# Patient Record
Sex: MALE | Race: WHITE | Employment: FULL TIME | URBAN - METROPOLITAN AREA
[De-identification: names, ages, dates, MRNs, and addresses within clinical notes are randomized per-mention and may not be internally consistent; named-entity substitution may affect disease eponyms.]

---

## 2019-09-05 ENCOUNTER — APPOINTMENT (OUTPATIENT)
Dept: GENERAL RADIOLOGY | Age: 74
End: 2019-09-05
Attending: EMERGENCY MEDICINE
Payer: MEDICARE

## 2019-09-05 ENCOUNTER — APPOINTMENT (OUTPATIENT)
Dept: GENERAL RADIOLOGY | Age: 74
End: 2019-09-05
Payer: MEDICARE

## 2019-09-05 ENCOUNTER — HOSPITAL ENCOUNTER (EMERGENCY)
Age: 74
Discharge: HOME | End: 2019-09-05
Attending: EMERGENCY MEDICINE
Payer: MEDICARE

## 2019-09-05 VITALS
DIASTOLIC BLOOD PRESSURE: 103 MMHG | RESPIRATION RATE: 18 BRPM | WEIGHT: 195 LBS | OXYGEN SATURATION: 97 % | BODY MASS INDEX: 26.41 KG/M2 | TEMPERATURE: 97.6 F | HEIGHT: 72 IN | HEART RATE: 67 BPM | SYSTOLIC BLOOD PRESSURE: 170 MMHG

## 2019-09-05 DIAGNOSIS — S80.01XA CONTUSION OF RIGHT KNEE, INITIAL ENCOUNTER: ICD-10-CM
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
## 2019-09-05 DIAGNOSIS — S63.281A DISLOCATION OF PROXIMAL INTERPHALANGEAL JOINT OF LEFT INDEX FINGER, INITIAL ENCOUNTER: ICD-10-CM

## 2019-09-05 DIAGNOSIS — T14.8XXA ABRASION: ICD-10-CM

## 2019-09-05 DIAGNOSIS — S61.211A LACERATION OF LEFT INDEX FINGER WITHOUT FOREIGN BODY WITHOUT DAMAGE TO NAIL, INITIAL ENCOUNTER: ICD-10-CM

## 2019-09-05 PROCEDURE — 77030002916 HC SUT ETHLN J&J -A

## 2019-09-05 PROCEDURE — 74011000250 HC RX REV CODE- 250: Performed by: PHYSICIAN ASSISTANT

## 2019-09-05 PROCEDURE — 74011250636 HC RX REV CODE- 250/636: Performed by: PHYSICIAN ASSISTANT

## 2019-09-05 PROCEDURE — 99283 EMERGENCY DEPT VISIT LOW MDM: CPT | Performed by: PHYSICIAN ASSISTANT

## 2019-09-05 PROCEDURE — 75810000293 HC SIMP/SUPERF WND  RPR: Performed by: PHYSICIAN ASSISTANT

## 2019-09-05 PROCEDURE — 73140 X-RAY EXAM OF FINGER(S): CPT

## 2019-09-05 PROCEDURE — 73562 X-RAY EXAM OF KNEE 3: CPT

## 2019-09-05 PROCEDURE — 73130 X-RAY EXAM OF HAND: CPT

## 2019-09-05 PROCEDURE — 77030008303 HC SPLNT FNGR ALUM CONC -A

## 2019-09-05 PROCEDURE — 75810000301 HC ER LEVEL 1 CLOSED TREATMNT FRACTURE/DISLOCATION: Performed by: PHYSICIAN ASSISTANT

## 2019-09-05 RX ORDER — CEPHALEXIN 500 MG/1
500 CAPSULE ORAL 4 TIMES DAILY
Qty: 40 CAP | Refills: 0 | Status: SHIPPED | OUTPATIENT
Start: 2019-09-05 | End: 2019-09-15

## 2019-09-05 RX ORDER — TRAMADOL HYDROCHLORIDE 50 MG/1
50 TABLET ORAL
Qty: 18 TAB | Refills: 0 | Status: SHIPPED | OUTPATIENT
Start: 2019-09-05 | End: 2019-09-08

## 2019-09-05 RX ORDER — CEFAZOLIN SODIUM 1 G/3ML
1 INJECTION, POWDER, FOR SOLUTION INTRAMUSCULAR; INTRAVENOUS
Status: DISCONTINUED | OUTPATIENT
Start: 2019-09-05 | End: 2019-09-05

## 2019-09-05 RX ADMIN — CEFAZOLIN SODIUM 1000 MG: 1 INJECTION, POWDER, FOR SOLUTION INTRAMUSCULAR; INTRAVENOUS at 16:39

## 2019-09-05 NOTE — ED NOTES
Knee abrasion dressed with neosporin, telfa, 4x4 and kerlix. Left hand abrasion to palm dressed with neosporin, telfa, 4x4 and kerlix. Finger laceration dressed with neosporin, telfa and tube gauze. Index finger stablized with aluminum finger splint to under side of hand.

## 2019-09-05 NOTE — DISCHARGE INSTRUCTIONS
Call the orthopedic physician for follow-up. Leave the splint in place until you see them. Antibiotics as directed. Return to the ED if having fevers, increasing pain or swelling to the finger, red streaking up your arm, nausea, vomiting or worsening symptoms. Rest, ice, elevate and avoid painful activities. Return to the ED if worsening in any way. Cuts on the Hand Closed With Stitches: Care Instructions  Your Care Instructions    A cut on your hand can be on your fingers, your thumb, or the front or back of your hand. Sometimes a cut can injure the tendons, blood vessels, or nerves of your hand. The doctor used stitches to close the cut. Using stitches also helps the cut heal and reduces scarring. The doctor may have given you a splint to help prevent you from moving your hand, fingers, or thumb. If the cut went deep and through the skin, the doctor put in two layers of stitches. The deeper layer brings the deep part of the cut together. These stitches will dissolve and don't need to be removed. The stitches in the upper layer are the ones you see on the cut. You will probably have a bandage. You will need to have the stitches removed, usually in 7 to 14 days. The doctor may suggest that you see a hand specialist if the cut is very deep or if you have trouble moving your fingers or have less feeling in your hand. The doctor has checked you carefully, but problems can develop later. If you notice any problems or new symptoms, get medical treatment right away. Follow-up care is a key part of your treatment and safety. Be sure to make and go to all appointments, and call your doctor if you are having problems. It's also a good idea to know your test results and keep a list of the medicines you take. How can you care for yourself at home? · Keep the cut dry for the first 24 to 48 hours. After this, you can shower if your doctor okays it. Pat the cut dry. · Don't soak the cut, such as in a bathtub. Your doctor will tell you when it's safe to get the cut wet. · If your doctor told you how to care for your cut, follow your doctor's instructions. If you did not get instructions, follow this general advice:  ? After the first 24 to 48 hours, wash around the cut with clean water 2 times a day. Don't use hydrogen peroxide or alcohol, which can slow healing. ? You may cover the cut with a thin layer of petroleum jelly, such as Vaseline, and a nonstick bandage. ? Apply more petroleum jelly and replace the bandage as needed. · Prop up the sore hand on a pillow anytime you sit or lie down during the next 3 days. Try to keep it above the level of your heart. This will help reduce swelling. · Avoid any activity that could cause your cut to reopen. · Do not remove the stitches on your own. Your doctor will tell you when to come back to have the stitches removed. · Be safe with medicines. Take pain medicines exactly as directed. ? If the doctor gave you a prescription medicine for pain, take it as prescribed. ? If you are not taking a prescription pain medicine, ask your doctor if you can take an over-the-counter medicine. When should you call for help? Call your doctor now or seek immediate medical care if:    · You have new pain, or your pain gets worse.     · The skin near the cut is cold or pale or changes color.     · You have tingling, weakness, or numbness near the cut.     · The cut starts to bleed, and blood soaks through the bandage. Oozing small amounts of blood is normal.     · You have trouble moving the area of the hand near the cut.     · You have symptoms of infection, such as:  ? Increased pain, swelling, warmth, or redness around the cut.  ? Red streaks leading from the cut.  ? Pus draining from the cut.  ? A fever.    Watch closely for changes in your health, and be sure to contact your doctor if:    · You do not get better as expected. Where can you learn more?   Go to http://hany-ant.info/. Enter T250 in the search box to learn more about \"Cuts on the Hand Closed With Stitches: Care Instructions. \"  Current as of: September 23, 2018  Content Version: 12.1  © 0539-0470 [employer]. Care instructions adapted under license by [employer] (which disclaims liability or warranty for this information). If you have questions about a medical condition or this instruction, always ask your healthcare professional. [address] any warranty or liability for your use of this information. Patient Education        Dislocated Finger: Care Instructions  Your Care Instructions  When the bones of a finger are forced out of their normal position, it is called a dislocated finger. This can happen when a finger jams or bends backward. It is common during sports. A doctor can put your finger back in its normal position. You probably knew that something was wrong with your finger right away. This is because a dislocated finger usually hurts a lot. And it doesn't look straight. Your doctor may have put a splint on the finger. This will keep it in position while it heals. Your doctor may also recommend exercises to strengthen your finger. Rest and home treatment can also help you get better. If you damaged bones or muscles, you may need more treatment. Follow-up care is a key part of your treatment and safety. Be sure to make and go to all appointments, and call your doctor if you are having problems. It's also a good idea to know your test results and keep a list of the medicines you take. How can you care for yourself at home? · If your doctor put a splint on your finger, wear it as directed. Do not remove it until your doctor says you can. · Do not do anything that makes the pain worse. · If your finger is swollen, put ice or a cold pack on it for 10 to 20 minutes at a time.  Try to do this every 1 to 2 hours for the next 3 days (when you are awake) or until the swelling goes down. Put a thin cloth between the ice and your skin. · Prop up your hand on a pillow when you ice it or anytime you sit or lie down during the next 3 days. Try to keep it above the level of your heart. This will help reduce swelling. · Take your medicines exactly as prescribed. Call your doctor if you think you are having a problem with your medicine. · Ask your doctor if you can take an over-the-counter pain medicine, such as acetaminophen (Tylenol), ibuprofen (Advil, Motrin), or naproxen (Aleve). Be safe with medicines. Read and follow all instructions on the label. · If your doctor recommends exercises, do them as directed. When should you call for help? Call your doctor now or seek immediate medical care if:    · You have new or worse pain.     · Your finger is cool or pale or changes color.     · You have tingling, weakness, or numbness in the finger.    Watch closely for changes in your health, and be sure to contact your doctor if:    · You do not get better as expected. Where can you learn more? Go to http://hany-ant.info/. Enter R117 in the search box to learn more about \"Dislocated Finger: Care Instructions. \"  Current as of: September 20, 2018  Content Version: 12.1  © 3951-1961 [employer]. Care instructions adapted under license by "[employer] (which disclaims liability or warranty for this information). If you have questions about a medical condition or this instruction, always ask your healthcare professional. [place] [address] any warranty or liability for your use of this information.

## 2019-09-05 NOTE — ED TRIAGE NOTES
Pt ambulatory to triage with c/o fall after tripping on a lip in the concrete during daily walk. Pt denies dizzinesss before the fall. Pt reports left index finger pain and lac with left knee pain and abrasion to left knee already covered before triage. Pt denies hitting head during falll.

## 2019-09-05 NOTE — ED PROVIDER NOTES
Patient states that he was walking his normal two mile walk today and tripped on a rise in the sidewalk. He landed on his left hand and right knee. He is left-handed. He has a laceration and dislocation to the left index finger and an abrasion to the right knee. He did not hit his head nor did he have any loss of consciousness. His last tetanus shot was 2 to 3 years ago. He is able to ambulate. He is not having any chest pain, shortness of breath, dizziness, weakness, dyspnea on exertion, orthopnea, weakness, trouble with urination or other new symptoms. His daughter drove him in today. The history is provided by the patient. Fall   The accident occurred less than 1 hour ago. The fall occurred while walking. She fell from a height of ground level. She landed on concrete. The volume of blood lost was minimal. Point of impact: Left hand, right knee. Pain location: Left hand. The pain is at a severity of 7/10. The pain is moderate. She was ambulatory at the scene. There was no entrapment after the fall. There was no drug use involved in the accident. There was no alcohol use involved in the accident. Associated symptoms include laceration. Pertinent negatives include no visual change, no fever, no numbness, no abdominal pain, no bowel incontinence, no nausea, no vomiting, no hematuria, no headaches, no extremity weakness, no hearing loss, no loss of consciousness and no tingling. The risk factors include being elderly. The symptoms are aggravated by activity. She has tried nothing for the symptoms. The patient's last tetanus shot was less than 5 years ago.        Past Medical History:   Diagnosis Date    Seasonal allergic rhinitis     Tonsillitis        Past Surgical History:   Procedure Laterality Date    HX TONSILLECTOMY           Family History:   Problem Relation Age of Onset    No Known Problems Mother     No Known Problems Father        Social History     Socioeconomic History    Marital status:      Spouse name: Not on file    Number of children: Not on file    Years of education: Not on file    Highest education level: Not on file   Occupational History    Not on file   Social Needs    Financial resource strain: Not on file    Food insecurity:     Worry: Not on file     Inability: Not on file    Transportation needs:     Medical: Not on file     Non-medical: Not on file   Tobacco Use    Smoking status: Never Smoker   Substance and Sexual Activity    Alcohol use: Yes     Comment: social    Drug use: Not on file    Sexual activity: Not on file   Lifestyle    Physical activity:     Days per week: Not on file     Minutes per session: Not on file    Stress: Not on file   Relationships    Social connections:     Talks on phone: Not on file     Gets together: Not on file     Attends Yarsanism service: Not on file     Active member of club or organization: Not on file     Attends meetings of clubs or organizations: Not on file     Relationship status: Not on file    Intimate partner violence:     Fear of current or ex partner: Not on file     Emotionally abused: Not on file     Physically abused: Not on file     Forced sexual activity: Not on file   Other Topics Concern    Not on file   Social History Narrative    Not on file         ALLERGIES: Other plant, animal, environmental and Sulfa (sulfonamide antibiotics)    Review of Systems   Constitutional: Negative. Negative for fever. HENT: Negative. Eyes: Negative. Respiratory: Negative. Cardiovascular: Negative. Gastrointestinal: Negative. Negative for abdominal pain, bowel incontinence, nausea and vomiting. Genitourinary: Negative. Negative for hematuria. Musculoskeletal: Negative. Negative for extremity weakness. Left hand, finger pain   Skin: Positive for wound. Negative for color change, pallor and rash. Neurological: Negative. Negative for tingling, loss of consciousness, numbness and headaches. Psychiatric/Behavioral: Negative. All other systems reviewed and are negative. Vitals:    09/05/19 1308   BP: (!) 158/101   Pulse: 74   Resp: 16   Temp: 97.6 °F (36.4 °C)   SpO2: 98%   Weight: 88.5 kg (195 lb)   Height: 6' (1.829 m)            Physical Exam   Constitutional: She is oriented to person, place, and time. She appears well-developed and well-nourished. HENT:   Head: Normocephalic and atraumatic. Right Ear: External ear normal.   Left Ear: External ear normal.   Nose: Nose normal.   Mouth/Throat: Oropharynx is clear and moist.   Eyes: Pupils are equal, round, and reactive to light. Conjunctivae and EOM are normal.   Neck: Normal range of motion. Neck supple. Cardiovascular: Normal rate, regular rhythm, normal heart sounds and intact distal pulses. Pulmonary/Chest: Effort normal and breath sounds normal.   Abdominal: Soft. Bowel sounds are normal.   Musculoskeletal: Normal range of motion. Hands:       Legs:  Neurological: She is alert and oriented to person, place, and time. She has normal reflexes. Skin: Skin is warm and dry. Laceration noted. Psychiatric: She has a normal mood and affect. Her behavior is normal. Judgment and thought content normal.   Nursing note and vitals reviewed.        MDM  Number of Diagnoses or Management Options  Abrasion:   Contusion of right knee, initial encounter:   Dislocation of proximal interphalangeal joint of left index finger, initial encounter:   Fall, initial encounter:   Laceration of left index finger without foreign body without damage to nail, initial encounter:      Amount and/or Complexity of Data Reviewed  Tests in the radiology section of CPT®: ordered and reviewed  Discuss the patient with other providers: yes (Dr. Yassine Leung)    Risk of Complications, Morbidity, and/or Mortality  Presenting problems: moderate  Diagnostic procedures: moderate  Management options: moderate    Patient Progress  Patient progress: improved         Wound Repair  Date/Time: 9/5/2019 5:15 PM  Performed by: PAPreparation: skin prepped with Betadine  Pre-procedure re-eval: Immediately prior to the procedure, the patient was reevaluated and found suitable for the planned procedure and any planned medications. Time out: Immediately prior to the procedure a time out was called to verify the correct patient, procedure, equipment, staff and marking as appropriate. .  Location details: left index finger  Wound length:2.5 cm or less  Anesthesia: digital block    Anesthesia:  Local Anesthetic: lidocaine 1% without epinephrine  Anesthetic total: 6 mL  Foreign bodies: no foreign bodies  Irrigation solution: saline  Debridement: none  Skin closure: 4-0 nylon  Number of sutures: 7  Technique: simple and interrupted  Dressing: 4x4, antibiotic ointment, tube gauze and splint  Patient tolerance: Patient tolerated the procedure well with no immediate complications  My total time at bedside, performing this procedure was 16-30 minutes. 4:35 PM Spoke with Dr. Johnson Sepulveda regarding patient. We discussed the patient's history, physical exam, x-ray findings and treatment. Patient was given a dose of Ancef since this is an open fracture/dislocation. The finger was reduced without difficulty and the wound was irrigated extensively with approximately 500 mL's of normal saline. The wound was closed and splinted. X-ray shows good anatomic reduction of the dislocation. Patient was told to call the orthopedic for follow-up and leave the dressing and splint on until he sees them. I have written for cephalexin at home to take as directed. His tetanus is up-to-date. He was instructed on wound care for the abrasion on his knee. Patient is stable for discharge at this time and ambulatory out of the ER without difficulty. His daughter is driving him home. I did write him some tramadol if needed at home for pain. The patient was observed in the ED.     Results Reviewed:  XR 2ND FINGER LT MIN 2 V   Final Result   IMPRESSION:   1. Interval alignment of the second PIP joint following reduction. 2. Possible volar plate fracture as described. XR HAND LT MIN 3 V   Final Result   IMPRESSION: Dorsal dislocation of the second PIP joint. XR KNEE LT 3 V   Final Result   IMPRESSION: Unremarkable left knee radiographs for acute bony abnormality. I discussed the results of all labs, procedures, radiographs, and treatments with the patient and available family. Treatment plan is agreed upon and the patient is ready for discharge. All voiced understanding of the discharge plan and medication instructions or changes as appropriate. Questions about treatment in the ED were answered. All were encouraged to return should symptoms worsen or new problems develop.

## 2019-09-05 NOTE — ED NOTES
I have reviewed discharge instructions with the patient. The patient and caregiver verbalized understanding. Patient left ED via Discharge Method: ambulatory to Home with daughter. Opportunity for questions and clarification provided. Patient given 2 scripts. Tramadol/keflex. Pt instructed to take tramadol for pain. Start keflex tomorrow morning and follow up with orthopedic md provided. Pt also instructed to check BP at the same time daily for 1 week and take readings to PCP. Pt states severe \"white coat syndrome. \"        To continue your aftercare when you leave the hospital, you may receive an automated call from our care team to check in on how you are doing. This is a free service and part of our promise to provide the best care and service to meet your aftercare needs.  If you have questions, or wish to unsubscribe from this service please call [phone]. Thank you for Choosing our [facility] Emergency Department.

## 2024-07-29 ENCOUNTER — HOSPITAL ENCOUNTER (EMERGENCY)
Age: 79
Discharge: HOME | End: 2024-07-29
Payer: MEDICARE

## 2024-07-29 VITALS
HEIGHT: 72 IN | BODY MASS INDEX: 26.41 KG/M2 | SYSTOLIC BLOOD PRESSURE: 175 MMHG | RESPIRATION RATE: 18 BRPM | OXYGEN SATURATION: 97 % | HEART RATE: 84 BPM | TEMPERATURE: 98.5 F | DIASTOLIC BLOOD PRESSURE: 98 MMHG | WEIGHT: 195 LBS

## 2024-07-29 DIAGNOSIS — K04.7 DENTAL ABSCESS: Primary | ICD-10-CM

## 2024-07-29 PROCEDURE — 99283 EMERGENCY DEPT VISIT LOW MDM: CPT

## 2024-07-29 PROCEDURE — 6370000000 HC RX 637 (ALT 250 FOR IP)

## 2024-07-29 RX ORDER — AMOXICILLIN AND CLAVULANATE POTASSIUM 875; 125 MG/1; MG/1
1 TABLET, FILM COATED ORAL
Status: COMPLETED | OUTPATIENT
Start: 2024-07-29 | End: 2024-07-29

## 2024-07-29 RX ORDER — HYDROCODONE BITARTRATE AND ACETAMINOPHEN 5; 325 MG/1; MG/1
1 TABLET ORAL EVERY 6 HOURS PRN
Qty: 10 TABLET | Refills: 0 | Status: SHIPPED | OUTPATIENT
Start: 2024-07-29 | End: 2024-08-01

## 2024-07-29 RX ORDER — HYDROCODONE BITARTRATE AND ACETAMINOPHEN 7.5; 325 MG/1; MG/1
1 TABLET ORAL
Status: COMPLETED | OUTPATIENT
Start: 2024-07-29 | End: 2024-07-29

## 2024-07-29 RX ORDER — AMOXICILLIN AND CLAVULANATE POTASSIUM 875; 125 MG/1; MG/1
1 TABLET, FILM COATED ORAL 2 TIMES DAILY
Qty: 20 TABLET | Refills: 0 | Status: SHIPPED | OUTPATIENT
Start: 2024-07-29 | End: 2024-08-08

## 2024-07-29 RX ADMIN — HYDROCODONE BITARTRATE AND ACETAMINOPHEN 1 TABLET: 7.5; 325 TABLET ORAL at 21:11

## 2024-07-29 RX ADMIN — AMOXICILLIN AND CLAVULANATE POTASSIUM 1 TABLET: 875; 125 TABLET, FILM COATED ORAL at 21:11

## 2024-07-29 ASSESSMENT — PAIN - FUNCTIONAL ASSESSMENT: PAIN_FUNCTIONAL_ASSESSMENT: 0-10

## 2024-07-29 ASSESSMENT — PAIN SCALES - GENERAL: PAINLEVEL_OUTOF10: 7

## 2024-07-30 NOTE — ED TRIAGE NOTES
Pt to ED with c/o right sided upper dental pain. Pt states been getting worse over the past week. Pt states swelling to area. Pt states broken tooth. Pt denies taking anything for pain prior to arrival. Pt alert ambulatory and in no acute distress at this time.

## 2024-07-30 NOTE — DISCHARGE INSTRUCTIONS
Prescription for pain medication and antibiotics sent to pharmacy as requested.  Please note the hydrocodone may make you feel drowsy and impair your ability to drive and potentially increase your risk for falling.  Make sure you take the entire course of antibiotics even if you are feeling better.    Follow-up with dentistry/oral surgery as planned.  Please return with any new or worsening symptoms.

## 2024-07-30 NOTE — ED PROVIDER NOTES
Emergency Department Provider Note       PCP: Anthony Jaffe MD   Age: 79 y.o.   Sex: male     DISPOSITION Decision To Discharge 07/29/2024 09:14:25 PM       ICD-10-CM    1. Dental abscess  K04.7 HYDROcodone-acetaminophen (NORCO) 5-325 MG per tablet          Medical Decision Making     Patient with fractured right upper tooth with developing periapical abscess.  No evidence of deep space infection.  Stable vital signs.  Able to tolerate secretions and p.o. without difficulty.  Will plan to discharge home with antibiotics and short course of analgesia until he can follow-up with dentistry.       1 acute, uncomplicated illness or injury.  Prescription drug management performed.    I independently ordered and reviewed each unique test.      History     79-year-old male presents to the emergency department with concern for worsening dental pain.  States he has a known fractured tooth in the right upper dentition which has recently become very painful.  He notes some associated facial swelling developing over the past 24 hours or so.  Denies any known fevers or difficulty swallowing.  States that he is planning to see an oral surgeon tomorrow but is hoping for some sort of relief in the interim.  He has no further complaints at this time.    The history is provided by the patient.       Physical Exam     Vitals signs and nursing note reviewed:  Vitals:    07/29/24 2026   BP: (!) 175/98   Pulse: 84   Resp: 18   Temp: 98.5 °F (36.9 °C)   TempSrc: Oral   SpO2: 97%   Weight: 88.5 kg (195 lb)   Height: 1.829 m (6')      Physical Exam  Vitals and nursing note reviewed.   Constitutional:       General: He is not in acute distress.     Appearance: Normal appearance. He is not ill-appearing or diaphoretic.   HENT:      Head: Normocephalic and atraumatic.      Right Ear: External ear normal.      Left Ear: External ear normal.      Nose: Nose normal.      Mouth/Throat:      Mouth: Mucous membranes are moist. No oral lesions.

## 2025-02-20 ENCOUNTER — HOSPITAL ENCOUNTER (EMERGENCY)
Age: 80
Discharge: HOME | End: 2025-02-20
Attending: EMERGENCY MEDICINE
Payer: MEDICARE

## 2025-02-20 VITALS
SYSTOLIC BLOOD PRESSURE: 141 MMHG | OXYGEN SATURATION: 97 % | TEMPERATURE: 98 F | HEART RATE: 89 BPM | DIASTOLIC BLOOD PRESSURE: 77 MMHG | RESPIRATION RATE: 16 BRPM | BODY MASS INDEX: 26.45 KG/M2 | WEIGHT: 195 LBS

## 2025-02-20 DIAGNOSIS — K40.90 UNILATERAL INGUINAL HERNIA WITHOUT OBSTRUCTION OR GANGRENE, RECURRENCE NOT SPECIFIED: ICD-10-CM

## 2025-02-20 DIAGNOSIS — R33.9 URINARY RETENTION: Primary | ICD-10-CM

## 2025-02-20 LAB
APPEARANCE UR: CLEAR
BACTERIA URNS QL MICRO: NEGATIVE /HPF
BILIRUB UR QL: NEGATIVE
COLOR UR: ABNORMAL
EPI CELLS #/AREA URNS HPF: ABNORMAL /HPF
GLUCOSE UR STRIP.AUTO-MCNC: NEGATIVE MG/DL
HGB UR QL STRIP: ABNORMAL
HYALINE CASTS URNS QL MICRO: ABNORMAL /LPF
KETONES UR QL STRIP.AUTO: NEGATIVE MG/DL
LEUKOCYTE ESTERASE UR QL STRIP.AUTO: NEGATIVE
NITRITE UR QL STRIP.AUTO: NEGATIVE
PH UR STRIP: 6.5 (ref 5–9)
PROT UR STRIP-MCNC: NEGATIVE MG/DL
RBC #/AREA URNS HPF: ABNORMAL /HPF
SP GR UR REFRACTOMETRY: 1.01 (ref 1–1.02)
UROBILINOGEN UR QL STRIP.AUTO: 0.2 EU/DL (ref 0.2–1)
WBC URNS QL MICRO: ABNORMAL /HPF

## 2025-02-20 PROCEDURE — 81001 URINALYSIS AUTO W/SCOPE: CPT

## 2025-02-20 PROCEDURE — 87086 URINE CULTURE/COLONY COUNT: CPT

## 2025-02-20 PROCEDURE — 51702 INSERT TEMP BLADDER CATH: CPT

## 2025-02-20 PROCEDURE — 99283 EMERGENCY DEPT VISIT LOW MDM: CPT

## 2025-02-20 RX ORDER — TAMSULOSIN HYDROCHLORIDE 0.4 MG/1
0.4 CAPSULE ORAL DAILY
Qty: 30 CAPSULE | Refills: 0 | Status: SHIPPED | OUTPATIENT
Start: 2025-02-20

## 2025-02-20 RX ORDER — HYDROCODONE BITARTRATE AND ACETAMINOPHEN 5; 325 MG/1; MG/1
1 TABLET ORAL EVERY 6 HOURS PRN
Qty: 4 TABLET | Refills: 0 | Status: SHIPPED | OUTPATIENT
Start: 2025-02-20 | End: 2025-02-21

## 2025-02-20 ASSESSMENT — PAIN SCALES - GENERAL: PAINLEVEL_OUTOF10: 10

## 2025-02-20 ASSESSMENT — PAIN DESCRIPTION - LOCATION: LOCATION: ABDOMEN

## 2025-02-20 NOTE — ED NOTES
Patient mobility status  with no difficulty.     I have reviewed discharge instructions with the patient.  The patient verbalized understanding.    Patient left ED via Discharge Method: ambulatory to Home with Extended Family:.    Opportunity for questions and clarification provided.     Patient given 0 scripts.      X 2 prescriptions sent to pharmacy

## 2025-02-22 LAB
BACTERIA SPEC CULT: NORMAL
SERVICE CMNT-IMP: NORMAL

## 2025-02-22 NOTE — ED PROVIDER NOTES
Emergency Department Provider Note       PCP: Anthony Jaffe MD   Age: 80 y.o.   Sex: male     DISPOSITION Decision To Discharge 02/20/2025 03:12:59 PM    ICD-10-CM    1. Urinary retention  R33.9 [facility]     HYDROcodone-acetaminophen (NORCO) 5-325 MG per tablet      2. Unilateral inguinal hernia without obstruction or gangrene, recurrence not specified  K40.90 [facility]     HYDROcodone-acetaminophen (NORCO) 5-325 MG per tablet          Medical Decision Making     Patient is here with his daughter.  He seems quite anxious about having the De La Fuente placed.  Went over instructions several times.  Discussed with them the need for follow-up and timeline.  Ordered leg bag and had nursing again reviewed with the patient extensively to help relieve anxiety.  Patient is fine with plan.  Discussed with him that we would refer him to surgery for evaluation of his inguinal hernia so it can be fixed as an outpatient if he so chooses.  He requested a few pain pills in case he has irritation from the De La Fuente catheter or when it is removed.     1 chronic illness with exacerbation.  Prescription drug management performed.  Shared medical decision making was utilized in creating the patients health plan today.  I independently ordered and reviewed each unique test.       The patients assessment required an independent historian: dtr.  The reason they were needed is important historical information not provided by the patient.                  History     Presents with complaint of inability to urinate.  Prior to seeing patient nursing placed a De La Fuente catheter.  He feels much better now.  He has never had a De La Fuente before.  He states he takes over-the-counter supplements for prostate.  He does not have a urologist.  He has a De La Fuente bag at bedside that has approximately a liter of urine in it.  He reports he has had an inguinal hernia for over 15 years.  He has no

## 2025-03-04 ENCOUNTER — OFFICE VISIT (OUTPATIENT)
Dept: UROLOGY | Age: 80
End: 2025-03-04
Payer: MEDICARE

## 2025-03-04 DIAGNOSIS — N40.1 BPH WITH OBSTRUCTION/LOWER URINARY TRACT SYMPTOMS: Primary | ICD-10-CM

## 2025-03-04 DIAGNOSIS — R33.9 URINARY RETENTION: ICD-10-CM

## 2025-03-04 DIAGNOSIS — N13.8 BPH WITH OBSTRUCTION/LOWER URINARY TRACT SYMPTOMS: Primary | ICD-10-CM

## 2025-03-04 DIAGNOSIS — N52.9 ERECTILE DYSFUNCTION, UNSPECIFIED ERECTILE DYSFUNCTION TYPE: ICD-10-CM

## 2025-03-04 DIAGNOSIS — R31.0 GROSS HEMATURIA: ICD-10-CM

## 2025-03-04 PROCEDURE — 1160F RVW MEDS BY RX/DR IN RCRD: CPT | Performed by: NURSE PRACTITIONER

## 2025-03-04 PROCEDURE — 99204 OFFICE O/P NEW MOD 45 MIN: CPT | Performed by: NURSE PRACTITIONER

## 2025-03-04 PROCEDURE — 1159F MED LIST DOCD IN RCRD: CPT | Performed by: NURSE PRACTITIONER

## 2025-03-04 PROCEDURE — 1123F ACP DISCUSS/DSCN MKR DOCD: CPT | Performed by: NURSE PRACTITIONER

## 2025-03-04 RX ORDER — SILDENAFIL 100 MG/1
100 TABLET, FILM COATED ORAL DAILY PRN
Qty: 10 TABLET | Refills: 2 | Status: SHIPPED | OUTPATIENT
Start: 2025-03-04

## 2025-03-04 RX ORDER — TAMSULOSIN HYDROCHLORIDE 0.4 MG/1
0.4 CAPSULE ORAL DAILY
Qty: 30 CAPSULE | Refills: 0 | Status: SHIPPED | OUTPATIENT
Start: 2025-03-04

## 2025-03-04 ASSESSMENT — ENCOUNTER SYMPTOMS
DIARRHEA: 0
VOMITING: 0
EYE PAIN: 0
SHORTNESS OF BREATH: 0
NAUSEA: 0
COUGH: 0
BACK PAIN: 0
INDIGESTION: 0
CONSTIPATION: 0
WHEEZING: 0
EYE DISCHARGE: 0
HEARTBURN: 0
BLOOD IN STOOL: 0
SKIN LESIONS: 0
ABDOMINAL PAIN: 0

## 2025-03-04 NOTE — PROGRESS NOTES
[facility]  [address] [address]  [phone]          Kahlil Sharma  : 1945    Chief Complaint   Patient presents with    New Patient    Urinary Retention          HPI     Kahlil Sharma is a 80 y.o. male referred from the ER. Seen 25 for inability to void. He reports NO prior h/o urinary retention or requiring De La Fuente catheter. He has been on OTC supplement for prostate. De La Fuente catheter was placed and started on Flomax. Urine culture negative. Here for follow up.     He feels this may have been induced by consuming marinara sauce because it was spicy. He also reports episode of hematuria after De La Fuente catheter placement. This resolved. He questioned if a kidney stone would cause retention. He has no h/o kidney stones.     No PSA on file. No personal or family h/o urological CA. Non-smoker.     Prior to hospitalization, he reports baseline nocturia and weak stream. Occ urinary hesitancy.     Has ED. Wants rx for Viagra.    Here w his daughter.      Past Medical History:   Diagnosis Date    Seasonal allergic rhinitis     Tonsillitis      Past Surgical History:   Procedure Laterality Date    TONSILLECTOMY       Current Outpatient Medications   Medication Sig Dispense Refill    tamsulosin (FLOMAX) 0.4 MG capsule Take 1 capsule by mouth daily 30 capsule 0    Cholecalciferol 50 MCG (2000) TABS Take 1 tablet by mouth daily       No current facility-administered medications for this visit.     Allergies   Allergen Reactions    Sulfa Antibiotics Other (See Comments)     Urinary pain     Social History     Socioeconomic History    Marital status:      Spouse name: Not on file    Number of children: Not on file    Years of education: Not on file    Highest education level: Not on file   Occupational History    Not on file   Tobacco Use    Smoking status: Never    Smokeless tobacco: Not on file   Vaping Use    Vaping status: Never Used   Substance and Sexual

## 2025-03-10 ENCOUNTER — RESULTS FOLLOW-UP (OUTPATIENT)
Dept: UROLOGY | Age: 80
End: 2025-03-10

## 2025-03-10 DIAGNOSIS — R30.0 DYSURIA: ICD-10-CM

## 2025-03-10 DIAGNOSIS — R30.0 DYSURIA: Primary | ICD-10-CM

## 2025-03-10 LAB
APPEARANCE UR: ABNORMAL
BACTERIA URNS QL MICRO: ABNORMAL /HPF
BILIRUB UR QL: NEGATIVE
COLOR UR: ABNORMAL
EPI CELLS #/AREA URNS HPF: ABNORMAL /HPF (ref 0–5)
GLUCOSE UR STRIP.AUTO-MCNC: NEGATIVE MG/DL
HGB UR QL STRIP: ABNORMAL
HYALINE CASTS URNS QL MICRO: ABNORMAL /LPF
KETONES UR QL STRIP.AUTO: ABNORMAL MG/DL
LEUKOCYTE ESTERASE UR QL STRIP.AUTO: ABNORMAL
NITRITE UR QL STRIP.AUTO: NEGATIVE
PH UR STRIP: 5.5 (ref 5–9)
PROT UR STRIP-MCNC: 100 MG/DL
RBC #/AREA URNS HPF: ABNORMAL /HPF (ref 0–5)
SP GR UR REFRACTOMETRY: 1.02 (ref 1–1.02)
UROBILINOGEN UR QL STRIP.AUTO: 1 EU/DL (ref 0.2–1)
WBC URNS QL MICRO: >100 /HPF (ref 0–4)

## 2025-03-10 RX ORDER — CIPROFLOXACIN 500 MG/1
500 TABLET, FILM COATED ORAL 2 TIMES DAILY
Qty: 14 TABLET | Refills: 0 | Status: SHIPPED | OUTPATIENT
Start: 2025-03-10 | End: 2025-03-16 | Stop reason: ALTCHOICE

## 2025-03-12 LAB
BACTERIA SPEC CULT: ABNORMAL
BACTERIA SPEC CULT: ABNORMAL
SERVICE CMNT-IMP: ABNORMAL

## 2025-03-12 RX ORDER — NITROFURANTOIN 25; 75 MG/1; MG/1
100 CAPSULE ORAL 2 TIMES DAILY
Qty: 14 CAPSULE | Refills: 0 | Status: SHIPPED | OUTPATIENT
Start: 2025-03-12 | End: 2025-03-19

## 2025-03-16 ENCOUNTER — APPOINTMENT (OUTPATIENT)
Dept: CT IMAGING | Age: 80
End: 2025-03-16
Payer: MEDICARE

## 2025-03-16 ENCOUNTER — HOSPITAL ENCOUNTER (EMERGENCY)
Age: 80
Discharge: HOME | End: 2025-03-16
Payer: MEDICARE

## 2025-03-16 ENCOUNTER — APPOINTMENT (OUTPATIENT)
Dept: ULTRASOUND IMAGING | Age: 80
End: 2025-03-16
Payer: MEDICARE

## 2025-03-16 VITALS
HEIGHT: 72 IN | WEIGHT: 180 LBS | OXYGEN SATURATION: 98 % | HEART RATE: 105 BPM | DIASTOLIC BLOOD PRESSURE: 96 MMHG | BODY MASS INDEX: 24.38 KG/M2 | RESPIRATION RATE: 18 BRPM | SYSTOLIC BLOOD PRESSURE: 129 MMHG | TEMPERATURE: 97.9 F

## 2025-03-16 DIAGNOSIS — K40.90 UNILATERAL INGUINAL HERNIA WITHOUT OBSTRUCTION OR GANGRENE, RECURRENCE NOT SPECIFIED: ICD-10-CM

## 2025-03-16 DIAGNOSIS — N40.0 ENLARGED PROSTATE: ICD-10-CM

## 2025-03-16 DIAGNOSIS — R33.9 URINARY RETENTION: Primary | ICD-10-CM

## 2025-03-16 DIAGNOSIS — K52.9 COLITIS: ICD-10-CM

## 2025-03-16 LAB
ANION GAP SERPL CALC-SCNC: 15 MMOL/L (ref 7–16)
APPEARANCE UR: CLEAR
BASOPHILS # BLD: 0.03 K/UL (ref 0–0.2)
BASOPHILS NFR BLD: 0.4 % (ref 0–2)
BILIRUB UR QL: NEGATIVE
BUN SERPL-MCNC: 24 MG/DL (ref 8–23)
CALCIUM SERPL-MCNC: 9.9 MG/DL (ref 8.8–10.2)
CHLORIDE SERPL-SCNC: 101 MMOL/L (ref 98–107)
CO2 SERPL-SCNC: 21 MMOL/L (ref 20–29)
COLOR UR: ABNORMAL
CREAT SERPL-MCNC: 1.19 MG/DL (ref 0.8–1.3)
DIFFERENTIAL METHOD BLD: NORMAL
EOSINOPHIL # BLD: 0.06 K/UL (ref 0–0.8)
EOSINOPHIL NFR BLD: 0.8 % (ref 0.5–7.8)
ERYTHROCYTE [DISTWIDTH] IN BLOOD BY AUTOMATED COUNT: 12.3 % (ref 11.9–14.6)
GLUCOSE SERPL-MCNC: 133 MG/DL (ref 70–99)
GLUCOSE UR STRIP.AUTO-MCNC: NEGATIVE MG/DL
HCT VFR BLD AUTO: 45.2 % (ref 41.1–50.3)
HGB BLD-MCNC: 15.1 G/DL (ref 13.6–17.2)
HGB UR QL STRIP: NEGATIVE
IMM GRANULOCYTES # BLD AUTO: 0.01 K/UL (ref 0–0.5)
IMM GRANULOCYTES NFR BLD AUTO: 0.1 % (ref 0–5)
KETONES UR QL STRIP.AUTO: ABNORMAL MG/DL
LEUKOCYTE ESTERASE UR QL STRIP.AUTO: NEGATIVE
LYMPHOCYTES # BLD: 1.13 K/UL (ref 0.5–4.6)
LYMPHOCYTES NFR BLD: 14.2 % (ref 13–44)
MCH RBC QN AUTO: 30.5 PG (ref 26.1–32.9)
MCHC RBC AUTO-ENTMCNC: 33.4 G/DL (ref 31.4–35)
MCV RBC AUTO: 91.3 FL (ref 82–102)
MONOCYTES # BLD: 0.53 K/UL (ref 0.1–1.3)
MONOCYTES NFR BLD: 6.7 % (ref 4–12)
NEUTS SEG # BLD: 6.19 K/UL (ref 1.7–8.2)
NEUTS SEG NFR BLD: 77.8 % (ref 43–78)
NITRITE UR QL STRIP.AUTO: NEGATIVE
NRBC # BLD: 0 K/UL (ref 0–0.2)
PH UR STRIP: 6 (ref 5–9)
PLATELET # BLD AUTO: 271 K/UL (ref 150–450)
PMV BLD AUTO: 10.3 FL (ref 9.4–12.3)
POTASSIUM SERPL-SCNC: 4.7 MMOL/L (ref 3.5–5.1)
PROT UR STRIP-MCNC: NEGATIVE MG/DL
RBC # BLD AUTO: 4.95 M/UL (ref 4.23–5.6)
SODIUM SERPL-SCNC: 137 MMOL/L (ref 136–145)
SP GR UR REFRACTOMETRY: 1.02 (ref 1–1.02)
UROBILINOGEN UR QL STRIP.AUTO: 0.2 EU/DL (ref 0.2–1)
WBC # BLD AUTO: 8 K/UL (ref 4.3–11.1)

## 2025-03-16 PROCEDURE — 80076 HEPATIC FUNCTION PANEL: CPT

## 2025-03-16 PROCEDURE — 99285 EMERGENCY DEPT VISIT HI MDM: CPT

## 2025-03-16 PROCEDURE — 96374 THER/PROPH/DIAG INJ IV PUSH: CPT

## 2025-03-16 PROCEDURE — 6360000004 HC RX CONTRAST MEDICATION: Performed by: PHYSICIAN ASSISTANT

## 2025-03-16 PROCEDURE — 6370000000 HC RX 637 (ALT 250 FOR IP): Performed by: PHYSICIAN ASSISTANT

## 2025-03-16 PROCEDURE — 85025 COMPLETE CBC W/AUTO DIFF WBC: CPT

## 2025-03-16 PROCEDURE — 74177 CT ABD & PELVIS W/CONTRAST: CPT

## 2025-03-16 PROCEDURE — 81003 URINALYSIS AUTO W/O SCOPE: CPT

## 2025-03-16 PROCEDURE — 51798 US URINE CAPACITY MEASURE: CPT

## 2025-03-16 PROCEDURE — 51702 INSERT TEMP BLADDER CATH: CPT

## 2025-03-16 PROCEDURE — 80048 BASIC METABOLIC PNL TOTAL CA: CPT

## 2025-03-16 PROCEDURE — 76870 US EXAM SCROTUM: CPT

## 2025-03-16 PROCEDURE — 2500000003 HC RX 250 WO HCPCS: Performed by: PHYSICIAN ASSISTANT

## 2025-03-16 RX ORDER — MORPHINE SULFATE 2 MG/ML
2 INJECTION, SOLUTION INTRAMUSCULAR; INTRAVENOUS
Status: COMPLETED | OUTPATIENT
Start: 2025-03-16 | End: 2025-03-16

## 2025-03-16 RX ORDER — LIDOCAINE HYDROCHLORIDE 20 MG/ML
JELLY TOPICAL
Status: COMPLETED | OUTPATIENT
Start: 2025-03-16 | End: 2025-03-16

## 2025-03-16 RX ORDER — CIPROFLOXACIN 500 MG/1
500 TABLET, FILM COATED ORAL 2 TIMES DAILY
Qty: 13 TABLET | Refills: 0 | Status: SHIPPED | OUTPATIENT
Start: 2025-03-16 | End: 2025-03-23

## 2025-03-16 RX ORDER — IOPAMIDOL 755 MG/ML
100 INJECTION, SOLUTION INTRAVASCULAR
Status: COMPLETED | OUTPATIENT
Start: 2025-03-16 | End: 2025-03-16

## 2025-03-16 RX ORDER — CIPROFLOXACIN 500 MG/1
500 TABLET, FILM COATED ORAL ONCE
Status: COMPLETED | OUTPATIENT
Start: 2025-03-16 | End: 2025-03-16

## 2025-03-16 RX ORDER — METRONIDAZOLE 500 MG/1
500 TABLET ORAL 3 TIMES DAILY
Qty: 21 TABLET | Refills: 0 | Status: SHIPPED | OUTPATIENT
Start: 2025-03-16 | End: 2025-03-23

## 2025-03-16 RX ORDER — METRONIDAZOLE 500 MG/1
500 TABLET ORAL
Status: COMPLETED | OUTPATIENT
Start: 2025-03-16 | End: 2025-03-16

## 2025-03-16 RX ADMIN — MORPHINE SULFATE 2 MG: 2 INJECTION, SOLUTION INTRAMUSCULAR; INTRAVENOUS at 15:10

## 2025-03-16 RX ADMIN — METRONIDAZOLE 500 MG: 500 TABLET ORAL at 22:11

## 2025-03-16 RX ADMIN — IOPAMIDOL 100 ML: 755 INJECTION, SOLUTION INTRAVENOUS at 15:43

## 2025-03-16 RX ADMIN — LIDOCAINE HYDROCHLORIDE: 20 JELLY TOPICAL at 15:25

## 2025-03-16 RX ADMIN — CIPROFLOXACIN 500 MG: 500 TABLET, FILM COATED ORAL at 22:11

## 2025-03-16 ASSESSMENT — PAIN SCALES - GENERAL: PAINLEVEL_OUTOF10: 7

## 2025-03-16 ASSESSMENT — PAIN DESCRIPTION - ORIENTATION: ORIENTATION: LOWER

## 2025-03-16 ASSESSMENT — PAIN - FUNCTIONAL ASSESSMENT: PAIN_FUNCTIONAL_ASSESSMENT: 0-10

## 2025-03-16 ASSESSMENT — PAIN DESCRIPTION - LOCATION: LOCATION: PELVIS

## 2025-03-16 NOTE — ED PROVIDER NOTES
errors may be present.  This note has not been completely proofread for errors.     Ruth Comer PA-C  03/16/25 [id number]

## 2025-03-16 NOTE — ED TRIAGE NOTES
Pt CO dysuria since 2/20 and started on flomax. Pt was seen in the ER then and gotten a palmer placed. Palmer was removed on 3/4. Pt was informed he had a UTI and given Macrobid.    Pt also advises that he was constipated for approx x1 week, but has been \"going for last 12 hours\". Pt advises that he has eliminated a large amount of stool.

## 2025-03-17 ENCOUNTER — RESULTS FOLLOW-UP (OUTPATIENT)
Dept: EMERGENCY DEPT | Age: 80
End: 2025-03-17

## 2025-03-17 LAB
ALBUMIN SERPL-MCNC: 3.4 G/DL (ref 3.2–4.6)
ALBUMIN/GLOB SERPL: 0.9 (ref 1–1.9)
ALP SERPL-CCNC: 81 U/L (ref 40–129)
ALT SERPL-CCNC: 29 U/L (ref 8–55)
AST SERPL-CCNC: 46 U/L (ref 15–37)
BILIRUB DIRECT SERPL-MCNC: <0.2 MG/DL (ref 0–0.4)
BILIRUB SERPL-MCNC: 0.6 MG/DL (ref 0–1.2)
GLOBULIN SER CALC-MCNC: 3.8 G/DL (ref 2.3–3.5)
PROT SERPL-MCNC: 7.2 G/DL (ref 6.3–8.2)

## 2025-03-17 NOTE — DISCHARGE INSTRUCTIONS
Please follow-up with the urologist in 1 to 2 days.    Call, make an appointment and f/up with general surgery.    Take medication as prescribed for colitis/enteritis.    Return to ER for worsening symptoms or other concerns.

## 2025-03-19 ENCOUNTER — TELEPHONE (OUTPATIENT)
Dept: UROLOGY | Age: 80
End: 2025-03-19

## 2025-03-19 NOTE — TELEPHONE ENCOUNTER
[phone] - Nini Hamlin, grand daughter     [phone]-Sarai Hamlin- daughter    Patient was seen in ER Sunday March 16 he has appt on Tuesday to be seen in the office, daughter wants to know if he needs to be seen earlier, he had a palmer placed at ER   Please call and let them know.

## 2025-03-25 ENCOUNTER — OFFICE VISIT (OUTPATIENT)
Dept: UROLOGY | Age: 80
End: 2025-03-25
Payer: MEDICARE

## 2025-03-25 DIAGNOSIS — N13.8 BPH WITH OBSTRUCTION/LOWER URINARY TRACT SYMPTOMS: ICD-10-CM

## 2025-03-25 DIAGNOSIS — N45.1 EPIDIDYMITIS, LEFT: ICD-10-CM

## 2025-03-25 DIAGNOSIS — R33.9 URINARY RETENTION: Primary | ICD-10-CM

## 2025-03-25 DIAGNOSIS — N40.1 BPH WITH OBSTRUCTION/LOWER URINARY TRACT SYMPTOMS: ICD-10-CM

## 2025-03-25 DIAGNOSIS — N50.3 EPIDIDYMAL CYST: ICD-10-CM

## 2025-03-25 DIAGNOSIS — I86.1 VARICOCELE: ICD-10-CM

## 2025-03-25 DIAGNOSIS — N43.3 HYDROCELE, UNSPECIFIED HYDROCELE TYPE: ICD-10-CM

## 2025-03-25 DIAGNOSIS — R93.89 ABNORMAL FINDING ON CT SCAN: ICD-10-CM

## 2025-03-25 DIAGNOSIS — K40.90 NON-RECURRENT UNILATERAL INGUINAL HERNIA WITHOUT OBSTRUCTION OR GANGRENE: ICD-10-CM

## 2025-03-25 PROCEDURE — 1159F MED LIST DOCD IN RCRD: CPT | Performed by: NURSE PRACTITIONER

## 2025-03-25 PROCEDURE — 1160F RVW MEDS BY RX/DR IN RCRD: CPT | Performed by: NURSE PRACTITIONER

## 2025-03-25 PROCEDURE — 1123F ACP DISCUSS/DSCN MKR DOCD: CPT | Performed by: NURSE PRACTITIONER

## 2025-03-25 PROCEDURE — 99214 OFFICE O/P EST MOD 30 MIN: CPT | Performed by: NURSE PRACTITIONER

## 2025-03-25 ASSESSMENT — ENCOUNTER SYMPTOMS
BACK PAIN: 0
NAUSEA: 0

## 2025-03-25 NOTE — PROGRESS NOTES
[facility]  [address] [place] [id number]  [phone]          Kahlil Sharma  : 1945    Chief Complaint   Patient presents with    Follow-up          HPI     Kahlil Sharma is a 80 y.o. male initially referred from the ER. Seen 25 for inability to void. He reports NO prior h/o urinary retention or requiring De La Fuente catheter. He has been on OTC supplement for prostate. De La Fuente catheter was placed and started on Flomax. Urine culture negative.      No PSA on file. No personal or family h/o urological CA. Non-smoker.      Prior to hospitalization, he reports baseline nocturia and weak stream. Occ urinary hesitancy.      Has ED. Given rx for Viagra 3/4/25.    He had TOV 3/4/25. Ended up back in urinary retention 3/16/25. Seen at the ER.    RAFAEL (3/16/25) IMPRESSION  1.Small to moderate size hydrocele on the left side.  2. Small epididymal cyst on the right side measures 0.5 x 0.6 x 0.7 cm.  3. Thickened and enlarged epididymis on the left side suspicious for chronic epididymitis.  4. Suspect small to moderate size varicocele on the left side    CT A/P (3/16/25) IMPRESSON  1. Mild/moderate left hydroureteronephrosis without obstructing stone or mass. This is probably related to marked prostatomegaly measuring over 8 cm. Urinary bladder is completely decompressed with a De La Fuente.  2. Mildly large lymph nodes surrounding the urinary bladder and prostate, raising suspicion for prostate carcinoma. Correlate with PSA. No retroperitoneal adenopathy.  3. Contracted transverse and descending colon with possible mild segmental colitis involving these segments.  4. Indirect left inguinal hernia containing sigmoid colon, at least 20 cm. No colonic obstruction.  5. Mild distal enteritis. Other findings as noted.    He is concerned about imaging. Reports no one explained the findings.    Lab Results   Component Value Date    CREATININE 1.19 2025     Treated with Cipro.     Here w

## 2025-03-26 ENCOUNTER — OFFICE VISIT (OUTPATIENT)
Dept: UROLOGY | Age: 80
End: 2025-03-26
Payer: MEDICARE

## 2025-03-26 DIAGNOSIS — N13.8 BPH WITH OBSTRUCTION/LOWER URINARY TRACT SYMPTOMS: ICD-10-CM

## 2025-03-26 DIAGNOSIS — N40.1 BPH WITH OBSTRUCTION/LOWER URINARY TRACT SYMPTOMS: ICD-10-CM

## 2025-03-26 DIAGNOSIS — R93.89 ABNORMAL FINDING ON CT SCAN: ICD-10-CM

## 2025-03-26 DIAGNOSIS — R33.9 URINARY RETENTION: Primary | ICD-10-CM

## 2025-03-26 PROCEDURE — 1123F ACP DISCUSS/DSCN MKR DOCD: CPT | Performed by: UROLOGY

## 2025-03-26 PROCEDURE — 99214 OFFICE O/P EST MOD 30 MIN: CPT | Performed by: UROLOGY

## 2025-03-26 PROCEDURE — 1159F MED LIST DOCD IN RCRD: CPT | Performed by: UROLOGY

## 2025-03-26 PROCEDURE — 1160F RVW MEDS BY RX/DR IN RCRD: CPT | Performed by: UROLOGY

## 2025-03-26 ASSESSMENT — ENCOUNTER SYMPTOMS: BACK PAIN: 0

## 2025-03-26 NOTE — PROGRESS NOTES
[facility]  [address] [place] [address]  [phone]          Kahlil Sharma  : 1945    Chief Complaint   Patient presents with    Follow-up     Discuss surgery          HPI     Kahlil Sharma is a 80 y.o. male initially referred from the ER. Seen 25 for inability to void. He reports NO prior h/o urinary retention or requiring De La Fuente catheter. He has been on OTC supplement for prostate. De La Fuente catheter was placed and started on Flomax. Urine culture negative.      No PSA on file. No personal or family h/o urological CA. Non-smoker.      Prior to hospitalization, he reports baseline nocturia and weak stream. Occ urinary hesitancy.      Has ED. Given rx for Viagra 3/4/25.     He had TOV 3/4/25. Ended up back in urinary retention 3/16/25. Seen at the ER.     RAFAEL (3/16/25) IMPRESSION  1.Small to moderate size hydrocele on the left side.  2. Small epididymal cyst on the right side measures 0.5 x 0.6 x 0.7 cm.  3. Thickened and enlarged epididymis on the left side suspicious for chronic epididymitis.  4. Suspect small to moderate size varicocele on the left side     CT A/P (3/16/25) IMPRESSON  1. Mild/moderate left hydroureteronephrosis without obstructing stone or mass. This is probably related to marked prostatomegaly measuring over 8 cm. Urinary bladder is completely decompressed with a De La Fuente.  2. Mildly large lymph nodes surrounding the urinary bladder and prostate, raising suspicion for prostate carcinoma. Correlate with PSA. No retroperitoneal adenopathy.  3. Contracted transverse and descending colon with possible mild segmental colitis involving these segments.  4. Indirect left inguinal hernia containing sigmoid colon, at least 20 cm. No colonic obstruction.  5. Mild distal enteritis. Other findings as noted.         Past Medical History:   Diagnosis Date    Seasonal allergic rhinitis     Tonsillitis      Past Surgical History:   Procedure Laterality Date

## 2025-04-10 DIAGNOSIS — N13.8 BPH WITH OBSTRUCTION/LOWER URINARY TRACT SYMPTOMS: ICD-10-CM

## 2025-04-10 DIAGNOSIS — N40.1 BPH WITH OBSTRUCTION/LOWER URINARY TRACT SYMPTOMS: ICD-10-CM

## 2025-04-10 DIAGNOSIS — R33.9 URINARY RETENTION: ICD-10-CM

## 2025-04-10 RX ORDER — TAMSULOSIN HYDROCHLORIDE 0.4 MG/1
0.4 CAPSULE ORAL DAILY
Qty: 30 CAPSULE | Refills: 0 | Status: SHIPPED | OUTPATIENT
Start: 2025-04-10